# Patient Record
Sex: MALE | Race: BLACK OR AFRICAN AMERICAN | NOT HISPANIC OR LATINO | Employment: UNEMPLOYED | URBAN - METROPOLITAN AREA
[De-identification: names, ages, dates, MRNs, and addresses within clinical notes are randomized per-mention and may not be internally consistent; named-entity substitution may affect disease eponyms.]

---

## 2019-06-04 ENCOUNTER — HOSPITAL ENCOUNTER (EMERGENCY)
Facility: HOSPITAL | Age: 29
Discharge: HOME | End: 2019-06-04
Attending: EMERGENCY MEDICINE
Payer: COMMERCIAL

## 2019-06-04 VITALS
OXYGEN SATURATION: 97 % | TEMPERATURE: 97.4 F | HEART RATE: 73 BPM | SYSTOLIC BLOOD PRESSURE: 143 MMHG | WEIGHT: 152.56 LBS | DIASTOLIC BLOOD PRESSURE: 75 MMHG | RESPIRATION RATE: 18 BRPM

## 2019-06-04 DIAGNOSIS — H93.8X9 EAR FULLNESS: ICD-10-CM

## 2019-06-04 DIAGNOSIS — J30.2 SEASONAL ALLERGIES: Primary | ICD-10-CM

## 2019-06-04 PROCEDURE — 99282 EMERGENCY DEPT VISIT SF MDM: CPT | Performed by: PHYSICIAN ASSISTANT

## 2019-06-04 PROCEDURE — 99282 EMERGENCY DEPT VISIT SF MDM: CPT

## 2019-06-04 RX ORDER — FLUTICASONE PROPIONATE 50 MCG
1 SPRAY, SUSPENSION (ML) NASAL DAILY
Qty: 16 G | Refills: 0 | Status: SHIPPED | OUTPATIENT
Start: 2019-06-04

## 2019-06-04 RX ORDER — LORATADINE 10 MG/1
10 TABLET ORAL DAILY
Qty: 20 TABLET | Refills: 0 | Status: SHIPPED | OUTPATIENT
Start: 2019-06-04

## 2022-10-14 ENCOUNTER — HOSPITAL ENCOUNTER (EMERGENCY)
Facility: HOSPITAL | Age: 32
Discharge: HOME | End: 2022-10-14
Attending: EMERGENCY MEDICINE | Admitting: EMERGENCY MEDICINE
Payer: COMMERCIAL

## 2022-10-14 VITALS
DIASTOLIC BLOOD PRESSURE: 74 MMHG | HEART RATE: 65 BPM | SYSTOLIC BLOOD PRESSURE: 143 MMHG | TEMPERATURE: 98 F | OXYGEN SATURATION: 99 % | WEIGHT: 152.34 LBS | RESPIRATION RATE: 16 BRPM

## 2022-10-14 DIAGNOSIS — H92.02 EAR DISCOMFORT, LEFT: Primary | ICD-10-CM

## 2022-10-14 PROCEDURE — 99282 EMERGENCY DEPT VISIT SF MDM: CPT

## 2022-10-14 PROCEDURE — 99282 EMERGENCY DEPT VISIT SF MDM: CPT | Performed by: EMERGENCY MEDICINE

## 2022-10-14 NOTE — Clinical Note
Darwin Danielson was seen and treated in our emergency department on 10/14/2022  Diagnosis:     Ema Cortés  may return to work on return date  He may return on this date: 10/15/2022         If you have any questions or concerns, please don't hesitate to call        Tabitha Hernandez MD    ______________________________           _______________          _______________  [facility] [place] [facility]                              Date                                Time

## 2022-10-14 NOTE — ED PROVIDER NOTES
History  Chief Complaint   Patient presents with   • Earache     L ear pain "throbbing" x1 week; no meds taken for symptoms     This is a 28 y o with PMH of seasonal allergies who presents today for earache for the past month  He states that the pain is like a pressure and happens intermittently  Reports having it last night, but happens 2-3 times a week  Denies ear discharge, fever, hearing loss, loss of balance and URI symptoms  Patient has not tried any medication at home  Prior to Admission Medications   Prescriptions Last Dose Informant Patient Reported? Taking?   fluticasone (FLONASE) 50 mcg/act nasal spray   No No   Si spray into each nostril daily   loratadine (CLARITIN) 10 mg tablet   No No   Sig: Take 1 tablet (10 mg total) by mouth daily      Facility-Administered Medications: None       History reviewed  No pertinent past medical history  Past Surgical History:   Procedure Laterality Date   • EYE SURGERY         History reviewed  No pertinent family history  I have reviewed and agree with the history as documented  E-Cigarette/Vaping     E-Cigarette/Vaping Substances     Social History     Tobacco Use   • Smoking status: Never Smoker   • Smokeless tobacco: Never Used   Substance Use Topics   • Alcohol use: Never   • Drug use: Never        Review of Systems   Constitutional: Negative for chills and fever  HENT: Positive for ear pain  Negative for ear discharge, facial swelling, hearing loss, rhinorrhea, sore throat and tinnitus  Eyes: Negative for pain and visual disturbance  Respiratory: Negative for cough and shortness of breath  Cardiovascular: Negative for chest pain and palpitations  Gastrointestinal: Negative for abdominal pain, nausea and vomiting  Genitourinary: Negative for dysuria and hematuria  Musculoskeletal: Negative for arthralgias and back pain  Skin: Negative for color change and rash  Neurological: Negative for seizures and syncope     All other systems reviewed and are negative  Physical Exam  ED Triage Vitals [10/14/22 1120]   Temperature Pulse Respirations Blood Pressure SpO2   98 °F (36 7 °C) 65 16 143/74 99 %      Temp Source Heart Rate Source Patient Position - Orthostatic VS BP Location FiO2 (%)   Tympanic -- Sitting Left arm --      Pain Score       --             Orthostatic Vital Signs  Vitals:    10/14/22 1120   BP: 143/74   Pulse: 65   Patient Position - Orthostatic VS: Sitting       Physical Exam  Vitals and nursing note reviewed  Constitutional:       General: He is not in acute distress  Appearance: Normal appearance  He is well-developed  HENT:      Head: Normocephalic and atraumatic  Right Ear: Tympanic membrane, ear canal and external ear normal  There is no impacted cerumen  Left Ear: Tympanic membrane, ear canal and external ear normal  There is no impacted cerumen  Nose: Nose normal  No congestion or rhinorrhea  Mouth/Throat:      Mouth: Mucous membranes are moist       Pharynx: Oropharynx is clear  No oropharyngeal exudate or posterior oropharyngeal erythema  Eyes:      General: No scleral icterus  Conjunctiva/sclera: Conjunctivae normal    Cardiovascular:      Rate and Rhythm: Normal rate and regular rhythm  Heart sounds: No murmur heard  Pulmonary:      Effort: Pulmonary effort is normal  No respiratory distress  Breath sounds: Normal breath sounds  No wheezing or rales  Musculoskeletal:      Cervical back: Neck supple  Right lower leg: No edema  Left lower leg: No edema  Skin:     General: Skin is warm and dry  Capillary Refill: Capillary refill takes less than 2 seconds  Neurological:      General: No focal deficit present  Mental Status: He is alert and oriented to person, place, and time     Psychiatric:         Mood and Affect: Mood normal          Behavior: Behavior normal          ED Medications  Medications - No data to display    Diagnostic Studies  Results Reviewed     None                 No orders to display         Procedures  Procedures      ED Course     Patient remained hemodynamically stable while in the ED  He was discharged home in stable condition  MDM  Number of Diagnoses or Management Options  Ear discomfort, left  Diagnosis management comments: 28 y o presenting with intermittent left ear discomfort for the past month  Ear exam was normal  No signs of infection or neurological disorders were noted  Patient was reassured and instructed to follow up with PCP  Disposition  Final diagnoses:   Ear discomfort, left     Time reflects when diagnosis was documented in both MDM as applicable and the Disposition within this note     Time User Action Codes Description Comment    10/14/2022 11:41 AM Edgar Zapata Add [H92 02] Ear discomfort, left       ED Disposition     ED Disposition   Discharge    Condition   Stable    Date/Time   Fri Oct 14, 2022 11:41 AM    Comment   Tonny Winn discharge to home/self care  Follow-up Information     Follow up With Specialties Details [facility]   [address] [place] [id number] [phone]            Discharge Medication List as of 10/14/2022 11:42 AM      CONTINUE these medications which have NOT CHANGED    Details   fluticasone (FLONASE) 50 mcg/act nasal spray 1 spray into each nostril daily, Starting Tue 6/4/2019, Print      loratadine (CLARITIN) 10 mg tablet Take 1 tablet (10 mg total) by mouth daily, Starting Tue 6/4/2019, Print           No discharge procedures on file  PDMP Review     None           ED Provider  Attending physically available and evaluated Tonny Winn  RODRIGO managed the patient along with the ED Attending      Electronically Signed by         Arcelia Donohue MD  10/14/22 3162

## 2023-11-22 ENCOUNTER — APPOINTMENT (EMERGENCY)
Dept: CT IMAGING | Facility: HOSPITAL | Age: 33
End: 2023-11-22
Payer: COMMERCIAL

## 2023-11-22 ENCOUNTER — HOSPITAL ENCOUNTER (EMERGENCY)
Facility: HOSPITAL | Age: 33
Discharge: HOME | End: 2023-11-22
Attending: EMERGENCY MEDICINE
Payer: COMMERCIAL

## 2023-11-22 VITALS
HEIGHT: 70 IN | OXYGEN SATURATION: 100 % | DIASTOLIC BLOOD PRESSURE: 94 MMHG | RESPIRATION RATE: 18 BRPM | SYSTOLIC BLOOD PRESSURE: 130 MMHG | WEIGHT: 147.49 LBS | BODY MASS INDEX: 21.11 KG/M2 | TEMPERATURE: 98 F | HEART RATE: 55 BPM

## 2023-11-22 DIAGNOSIS — R10.9 ABDOMINAL PAIN: Primary | ICD-10-CM

## 2023-11-22 DIAGNOSIS — K52.9 ENTERITIS: ICD-10-CM

## 2023-11-22 DIAGNOSIS — Q63.1 HORSESHOE KIDNEY: ICD-10-CM

## 2023-11-22 LAB
ANION GAP SERPL CALCULATED.3IONS-SCNC: 5 MMOL/L
BASOPHILS # BLD AUTO: 0.07 THOUSANDS/ÂΜL (ref 0–0.1)
BASOPHILS NFR BLD AUTO: 1 % (ref 0–1)
BILIRUB UR QL STRIP: NEGATIVE
BUN SERPL-MCNC: 11 MG/DL (ref 5–25)
CALCIUM SERPL-MCNC: 9.5 MG/DL (ref 8.4–10.2)
CHLORIDE SERPL-SCNC: 102 MMOL/L (ref 96–108)
CLARITY UR: CLEAR
CO2 SERPL-SCNC: 28 MMOL/L (ref 21–32)
COLOR UR: YELLOW
CREAT SERPL-MCNC: 0.92 MG/DL (ref 0.6–1.3)
EOSINOPHIL # BLD AUTO: 0.06 THOUSAND/ÂΜL (ref 0–0.61)
EOSINOPHIL NFR BLD AUTO: 1 % (ref 0–6)
ERYTHROCYTE [DISTWIDTH] IN BLOOD BY AUTOMATED COUNT: 13.3 % (ref 11.6–15.1)
GFR SERPL CREATININE-BSD FRML MDRD: 108 ML/MIN/1.73SQ M
GLUCOSE SERPL-MCNC: 81 MG/DL (ref 65–140)
GLUCOSE UR STRIP-MCNC: NEGATIVE MG/DL
HCT VFR BLD AUTO: 39.7 % (ref 36.5–49.3)
HGB BLD-MCNC: 13.3 G/DL (ref 12–17)
HGB UR QL STRIP.AUTO: NEGATIVE
IMM GRANULOCYTES # BLD AUTO: 0.01 THOUSAND/UL (ref 0–0.2)
IMM GRANULOCYTES NFR BLD AUTO: 0 % (ref 0–2)
KETONES UR STRIP-MCNC: NEGATIVE MG/DL
LEUKOCYTE ESTERASE UR QL STRIP: NEGATIVE
LYMPHOCYTES # BLD AUTO: 3.08 THOUSANDS/ÂΜL (ref 0.6–4.47)
LYMPHOCYTES NFR BLD AUTO: 59 % (ref 14–44)
MCH RBC QN AUTO: 28.8 PG (ref 26.8–34.3)
MCHC RBC AUTO-ENTMCNC: 33.5 G/DL (ref 31.4–37.4)
MCV RBC AUTO: 86 FL (ref 82–98)
MONOCYTES # BLD AUTO: 0.55 THOUSAND/ÂΜL (ref 0.17–1.22)
MONOCYTES NFR BLD AUTO: 11 % (ref 4–12)
NEUTROPHILS # BLD AUTO: 1.45 THOUSANDS/ÂΜL (ref 1.85–7.62)
NEUTS SEG NFR BLD AUTO: 28 % (ref 43–75)
NITRITE UR QL STRIP: NEGATIVE
NRBC BLD AUTO-RTO: 0 /100 WBCS
PH UR STRIP.AUTO: 5.5 [PH] (ref 4.5–8)
PLATELET # BLD AUTO: 249 THOUSANDS/UL (ref 149–390)
PMV BLD AUTO: 10.2 FL (ref 8.9–12.7)
POTASSIUM SERPL-SCNC: 4 MMOL/L (ref 3.5–5.3)
PROT UR STRIP-MCNC: NEGATIVE MG/DL
RBC # BLD AUTO: 4.62 MILLION/UL (ref 3.88–5.62)
SODIUM SERPL-SCNC: 135 MMOL/L (ref 135–147)
SP GR UR STRIP.AUTO: >=1.03 (ref 1–1.03)
UROBILINOGEN UR QL STRIP.AUTO: 0.2 E.U./DL
WBC # BLD AUTO: 5.22 THOUSAND/UL (ref 4.31–10.16)

## 2023-11-22 PROCEDURE — 36415 COLL VENOUS BLD VENIPUNCTURE: CPT | Performed by: EMERGENCY MEDICINE

## 2023-11-22 PROCEDURE — 74177 CT ABD & PELVIS W/CONTRAST: CPT

## 2023-11-22 PROCEDURE — 81003 URINALYSIS AUTO W/O SCOPE: CPT

## 2023-11-22 PROCEDURE — 80048 BASIC METABOLIC PNL TOTAL CA: CPT | Performed by: EMERGENCY MEDICINE

## 2023-11-22 PROCEDURE — 99284 EMERGENCY DEPT VISIT MOD MDM: CPT

## 2023-11-22 PROCEDURE — G1004 CDSM NDSC: HCPCS

## 2023-11-22 PROCEDURE — 99285 EMERGENCY DEPT VISIT HI MDM: CPT | Performed by: EMERGENCY MEDICINE

## 2023-11-22 PROCEDURE — 85025 COMPLETE CBC W/AUTO DIFF WBC: CPT | Performed by: EMERGENCY MEDICINE

## 2023-11-22 RX ADMIN — IOHEXOL 100 ML: 350 INJECTION, SOLUTION INTRAVENOUS at 19:04

## 2023-11-22 NOTE — Clinical Note
Alisha Mahoney was seen and treated in our emergency department on 11/22/2023. No restrictions            Diagnosis:     Sara Winn  may return to work on return date. He may return on this date: 11/29/2023         If you have any questions or concerns, please don't hesitate to call.       Tonya Keller RN    ______________________________           _______________          _______________  Hospital Representative                              Date                                Time

## 2023-11-22 NOTE — ED PROVIDER NOTES
History  Chief Complaint   Patient presents with    Abdominal Pain     Pt reports lower abd pain/groin area for months, denies N/V. Reports cloudy urine, denies pain, burning. A 60-year-old male with no significant past medical history; presents with lower abdominal pain that has been present for the past several months, although colicky in nature. Patient reports increased pain more recently. He has also noticed cloudy urine over the past month, however denies dysuria, urinary frequency, urinary urgency and discharge. He has been tolerating a regular diet, and reports regular bowel nonbloody movements. Patient otherwise denies fever, chills, chest pain, shortness of breath, nausea, vomiting, diarrhea, peripheral edema and rashes. He has not been evaluated for this pain. He denies known family history of Crohn's, ulcerative colitis and cancer. History provided by:  Patient and medical records  Abdominal Pain      Prior to Admission Medications   Prescriptions Last Dose Informant Patient Reported? Taking?   fluticasone (FLONASE) 50 mcg/act nasal spray   No No   Si spray into each nostril daily   loratadine (CLARITIN) 10 mg tablet   No No   Sig: Take 1 tablet (10 mg total) by mouth daily      Facility-Administered Medications: None       History reviewed. No pertinent past medical history. Past Surgical History:   Procedure Laterality Date    EYE SURGERY         History reviewed. No pertinent family history. I have reviewed and agree with the history as documented. E-Cigarette/Vaping     E-Cigarette/Vaping Substances     Social History     Tobacco Use    Smoking status: Never    Smokeless tobacco: Never   Substance Use Topics    Alcohol use: Never    Drug use: Never       Review of Systems   Gastrointestinal:  Positive for abdominal pain. All other systems reviewed and are negative.       Physical Exam  Physical Exam  General Appearance: alert and oriented, nad, non toxic appearing  Skin: Warm, dry, intact. No cyanosis  HEENT: Atraumatic, normocephalic. No eye drainage. Normal hearing. Moist mucous membranes. Neck: Supple, trachea midline  Cardiac: RRR; no murmurs, rub, gallops. No pedal edema, 2+ pulses  Pulmonary: lungs CTAB; no wheezes, rales, rhonchi  Gastrointestinal: abdomen soft, mild left lower quadrant tenderness, nondistended; no guarding or rebound tenderness; good bowel sounds, no mass or bruits  Extremities:  No deformities.   No calf tenderness, no clubbing  Neuro:  no focal motor or sensory deficits, CN 2-12 grossly intact  Psych:  Normal mood and affect, normal judgement and insight      Vital Signs  ED Triage Vitals [11/22/23 1625]   Temperature Pulse Respirations Blood Pressure SpO2   98 °F (36.7 °C) 59 18 140/78 100 %      Temp Source Heart Rate Source Patient Position - Orthostatic VS BP Location FiO2 (%)   Oral Monitor Sitting Right arm --      Pain Score       No Pain           Vitals:    11/22/23 1625 11/22/23 1935   BP: 140/78 130/94   Pulse: 59 55   Patient Position - Orthostatic VS: Sitting Sitting         Visual Acuity      ED Medications  Medications   iohexol (OMNIPAQUE) 350 MG/ML injection (MULTI-DOSE) 100 mL (100 mL Intravenous Given 11/22/23 1904)       Diagnostic Studies  Results Reviewed       Procedure Component Value Units Date/Time    Basic metabolic panel [[id number]] Collected: 11/22/23 1804    Lab Status: Final result Specimen: Blood from Arm, Right Updated: 11/22/23 1825     Sodium 135 mmol/L      Potassium 4.0 mmol/L      Chloride 102 mmol/L      CO2 28 mmol/L      ANION GAP 5 mmol/L      BUN 11 mg/dL      Creatinine 0.92 mg/dL      Glucose 81 mg/dL      Calcium 9.5 mg/dL      eGFR 108 ml/min/1.73sq m     Narrative:      [place] guidelines for Chronic Kidney Disease (CKD):     Stage 1 with normal or high GFR (GFR > 90 mL/min/1.73 square meters)    Stage 2 Mild CKD (GFR = 60-89 mL/min/1.73 square meters)    Stage 3A Moderate CKD (GFR = 45-59 mL/min/1.73 square meters)    Stage 3B Moderate CKD (GFR = 30-44 mL/min/1.73 square meters)    Stage 4 Severe CKD (GFR = 15-29 mL/min/1.73 square meters)    Stage 5 End Stage CKD (GFR <15 mL/min/1.73 square meters)  Note: GFR calculation is accurate only with a steady state creatinine    Urine Macroscopic, POC [[id number]] Collected: 11/22/23 1810    Lab Status: Final result Specimen: Urine Updated: 11/22/23 1811     Color, UA Yellow     Clarity, UA Clear     pH, UA 5.5     Leukocytes, UA Negative     Nitrite, UA Negative     Protein, UA Negative mg/dl      Glucose, UA Negative mg/dl      Ketones, UA Negative mg/dl      Urobilinogen, UA 0.2 E.U./dl      Bilirubin, UA Negative     Occult Blood, UA Negative     Specific Gravity, UA >=1.030    Narrative:      CLINITEK RESULT    CBC and differential [[id number]]  (Abnormal) Collected: 11/22/23 1804    Lab Status: Final result Specimen: Blood from Arm, Right Updated: 11/22/23 1809     WBC 5.22 Thousand/uL      RBC 4.62 Million/uL      Hemoglobin 13.3 g/dL      Hematocrit 39.7 %      MCV 86 fL      MCH 28.8 pg      MCHC 33.5 g/dL      RDW 13.3 %      MPV 10.2 fL      Platelets 603 Thousands/uL      nRBC 0 /100 WBCs      Neutrophils Relative 28 %      Immat GRANS % 0 %      Lymphocytes Relative 59 %      Monocytes Relative 11 %      Eosinophils Relative 1 %      Basophils Relative 1 %      Neutrophils Absolute 1.45 Thousands/µL      Immature Grans Absolute 0.01 Thousand/uL      Lymphocytes Absolute 3.08 Thousands/µL      Monocytes Absolute 0.55 Thousand/µL      Eosinophils Absolute 0.06 Thousand/µL      Basophils Absolute 0.07 Thousands/µL                    CT abdomen pelvis with contrast   Final Result by Rodolfo Rodas MD (11/22 1943)   1. Mild free fluid within the lower pelvis, abnormal in a young male patient. Consider occult enteritis. Normal appendix identified. 2. Horseshoe kidney.                   Workstation performed: [id number] Procedures  Procedures         ED Course  ED Course as of 11/22/23 2258   Wed Nov 22, 2023   1820 Urine Macroscopic, POC  Negative for infection   1826 Labs WNL   1947 CT abdomen pelvis with contrast  1.) Mild free fluid within the lower pelvis, abnormal in a young male patient. Consider occult enteritis. Normal appendix identified. 2.) Horseshoe kidney. SBIRT 20yo+      Flowsheet Row Most Recent Value   Initial Alcohol Screen: US AUDIT-C     1. How often do you have a drink containing alcohol? 0 Filed at: 11/22/2023 1818   2. How many drinks containing alcohol do you have on a typical day you are drinking? 0 Filed at: 11/22/2023 1818   3a. Male UNDER 65: How often do you have five or more drinks on one occasion? 0 Filed at: 11/22/2023 1818   Audit-C Score 0 Filed at: 11/22/2023 1818   KENNEDI: How many times in the past year have you. .. Used an illegal drug or used a prescription medication for non-medical reasons? Never Filed at: 11/22/2023 1818                      Medical Decision Making  A 77-year-old male presents with acute on chronic lower abdominal pain, associated with cloudy urine. Patient with mild left lower quadrant tenderness on exam.  Will check labs for electrolyte abnormality, anemia & KIRBY. Will check urine for infection. Offered gonorrhea and chlamydia testing, however patient declines. Will obtain CTAP to evaluate for intra-abdominal pathology, including inflammatory bowel disease. Labs within normal limits, UA negative. CT with mild free fluid, suggesting enteritis. Given ongoing symptoms over the past few months, recommend GI follow up for further evaluation. Pt in agreement. Amount and/or Complexity of Data Reviewed  Labs: ordered. Decision-making details documented in ED Course. Radiology: ordered. Decision-making details documented in ED Course. Risk  Prescription drug management.              Disposition  Final diagnoses:   Abdominal pain   Enteritis   Horseshoe kidney     Time reflects when diagnosis was documented in both MDM as applicable and the Disposition within this note       Time User Action Codes Description Comment    11/22/2023  8:05 PM Alaina, [address] [place] [R10.9] Abdominal pain     11/22/2023  8:05 PM Tara Fell Add [K52.9] Enteritis     11/22/2023  8:05 PM Tara Fell Add [Q63.1] Horseshoe kidney           ED Disposition       ED Disposition   Discharge    Condition   Stable    Date/Time   Wed Nov 22, 2023 2005    Comment   Lana Reinoso discharge to home/self care. Follow-up Information       Follow up With Specialties Details Why Contact Info Additional [address] [facility] Schedule an appointment as soon as possible for a visit in 3 days For re-evaluation [address] [facility] [phone] [address] [facility] Schedule an appointment as soon as possible for a visit  For re-evaluation [address]  [place] [address] [facility],[address] [id number]  [address] [facility], [address], [place], [place], [id number] [phone]            Discharge Medication List as of 11/22/2023  8:06 PM        CONTINUE these medications which have NOT CHANGED    Details   fluticasone (FLONASE) 50 mcg/act nasal spray 1 spray into each nostril daily, Starting Tue 6/4/2019, Print      loratadine (CLARITIN) 10 mg tablet Take 1 tablet (10 mg total) by mouth daily, Starting Tue 6/4/2019, Print             No discharge procedures on file.     PDMP Review       None            ED Provider  Electronically Signed by             Forest Schuler DO  11/22/23 0109

## 2024-01-03 ENCOUNTER — TELEPHONE (OUTPATIENT)
Age: 34
End: 2024-01-03

## 2024-01-03 NOTE — TELEPHONE ENCOUNTER
PT missed appt - works nights sleeps days and lost track of time     Patients GI provider:  Dr. Corona     Number to return call: [phone]    Reason for call: Pt GI complaint -- LLQ abdominal pain    Scheduled procedure/appointment date if applicable: Appt 2/7/24

## 2024-02-07 ENCOUNTER — OFFICE VISIT (OUTPATIENT)
Dept: GASTROENTEROLOGY | Facility: MEDICAL CENTER | Age: 34
End: 2024-02-07
Payer: COMMERCIAL

## 2024-02-07 VITALS
BODY MASS INDEX: 21.64 KG/M2 | HEART RATE: 57 BPM | TEMPERATURE: 97.8 F | SYSTOLIC BLOOD PRESSURE: 112 MMHG | DIASTOLIC BLOOD PRESSURE: 71 MMHG | WEIGHT: 151.2 LBS | HEIGHT: 70 IN

## 2024-02-07 DIAGNOSIS — N50.89 SCROTAL SWELLING: ICD-10-CM

## 2024-02-07 DIAGNOSIS — R10.32 LLQ PAIN: Primary | ICD-10-CM

## 2024-02-07 PROCEDURE — 99244 OFF/OP CNSLTJ NEW/EST MOD 40: CPT | Performed by: STUDENT IN AN ORGANIZED HEALTH CARE EDUCATION/TRAINING PROGRAM

## 2024-02-07 NOTE — PROGRESS NOTES
[facility] - Outpatient Consultation  Jeff Schultz 33 y.o. male MRN: [id number]  Encounter: [id number]      Assessment and Plan:    1. LLQ pain    2. Scrotal swelling        33 y.o. male without significant medical history who is referred to GI for LLQ pain.    I do not think patient's transient LLQ pain lasting seconds at a time is related to the GI tract at all, especially given no relationship with bowel movements or food intake. I do not think his symptoms are consistent with food poisoning or gastroenteritis as he was told in the ER.    Rather, it sounds like he had prominent scrotal swelling and left groin pain during the peak of his LLQ pain, and both the scrotal swelling and pain appear to be improving together. He also has increased urination. I recommended urology evaluation, but patient declined since he is wary of having anyone performing a genitourinary exam. He also declined a scrotal exam from me today.    For the sake of thoroughness, I will check celiac serologies and inflammatory markers. Fecal calprotectin was recommended as well, but patient is not interested in a stool test at this time. No indication for colonoscopy.    - CRP, celiac serologies  - Fiber supplementation or increase fiber in diet  - Consider urology referral in the future if symptoms flare up again. Patient declines referral at this time. Defer further discussions to PCP  - No plan for colonoscopy    PRN follow up    Orders Placed This Encounter   Procedures    C-reactive protein    Tissue transglutaminase, IgA    IgA     ______________________________________________________________________    History of Present Illness:    Jeff Schultz is a 33 y.o. male without significant medical history who is referred to GI for LLQ pain.    Patient reports to several months of intermittent LLQ pain. Initially, the pain was sharp and severe, causing him to have to change positions. At the time, he  "also noticed left scrotal swelling which seemed unusual, and the pain seemed to be radiating within the left groin as well. Over time, he left scrotal swelling has decreased, and his LLQ pain has improved significantly as well. Currently, he occasionally notices a mild LLQ discomfort that lasts seconds at a time. No clear trigger for pain, and it is not associated with food intake or bowel movements. He does admit to more frequent urination but no dysuria. He has not been sexually active for years.    He is mildly constipated at times.  No diarrhea, hematochezia, melena, unintentional weight loss, or family history of IBD.    He went to  ER 11/2023 where labs including CBC, BMP, and UA were unremarkable. CT A/P showed a horseshoe kidney and mild free fluid int he lower pelvis. He was told he could have a viral gastroenteritis or food poisoning. He went to [facility] ER 12/2023 where he was discharged without further work-up.      Review of Systems:  As per HPI. Otherwise negative.      Historical Information   History reviewed. No pertinent past medical history.  Past Surgical History:   Procedure Laterality Date    EYE SURGERY       Social History   Social History     Substance and Sexual Activity   Alcohol Use Never     Social History     Substance and Sexual Activity   Drug Use Never     Social History     Tobacco Use   Smoking Status Never   Smokeless Tobacco Never     History reviewed. No pertinent family history.    Meds/Allergies       Current Outpatient Medications:     fluticasone (FLONASE) 50 mcg/act nasal spray    loratadine (CLARITIN) 10 mg tablet    No Known Allergies        Objective     Blood pressure 112/71, pulse 57, temperature 97.8 °F (36.6 °C), temperature source Tympanic Core, height 5' 10\" (1.778 m), weight 68.6 kg (151 lb 3.2 oz). Body mass index is 21.69 kg/m².        Physical Exam:      General: No acute distress  Abdomen: Soft, non-tender, non-distended, normoactive bowel sounds, neg Carnett's " sign  : patient declined  Neuro: Awake, alert, oriented x 3    Lab Results:   Lab Results   Component Value Date/Time    WBC 5.22 11/22/2023 06:04 PM    HGB 13.3 11/22/2023 06:04 PM     11/22/2023 06:04 PM    SODIUM 135 11/22/2023 06:04 PM    SODIUM 137 02/27/2019 03:02 PM    K 4.0 11/22/2023 06:04 PM    K 4.7 02/27/2019 03:02 PM     11/22/2023 06:04 PM     02/27/2019 03:02 PM    CO2 28 11/22/2023 06:04 PM    CO2 26 02/27/2019 03:02 PM    BUN 11 11/22/2023 06:04 PM    BUN 9 02/27/2019 03:02 PM    CREATININE 0.92 11/22/2023 06:04 PM    CREATININE 1.06 02/27/2019 03:02 PM    AST 28 02/27/2019 03:02 PM    ALT 31 02/27/2019 03:02 PM    ALKPHOS 66 02/27/2019 03:02 PM    TBILI 0.7 02/27/2019 03:02 PM    ALB 4.4 02/27/2019 03:02 PM

## 2024-02-07 NOTE — PATIENT INSTRUCTIONS
- Start taking fiber supplementation (Metamucil, Citrucel, Benefiber, etc). Please mix one heaping tablespoon with an 8 ounce glass of water and drink every morning. This can be increased to twice per day if needed. Fiber tablets and gummies will also work. The goal is to have regular, formed, and easy-to-pass bowel movements. The effect is gradual, so please use it consistently for at least 2 weeks. Stay well-hydrated to avoid constipation.

## 2024-03-12 ENCOUNTER — TELEPHONE (OUTPATIENT)
Age: 34
End: 2024-03-12

## 2024-03-12 NOTE — TELEPHONE ENCOUNTER
Called and LMOM for patient to return call to the office. If he calls back, wish to have him have PCP order US scrotum/testicles prior to scheduled appointment for further evaluation of scrotal swelling.

## 2024-03-12 NOTE — TELEPHONE ENCOUNTER
New Patient    What is the reason for the patient’s appointment?:Patient called stating he is having swelling on the left side testicle.  He stated about 6 months.  Patient is scheduled to see Dr. Randolph 04/25/24 at 1030 am. At [facility]. Please review if time frame is appropriate.     Patient can be reached [phone]    What office location does the patient prefer?:[facility]     Does patient have Imaging/Lab Results:    Have patient records been requested?:  If No, are the records showing in Epic:       INSURANCE:   Do we accept the patient's insurance or is the patient Self-Pay?:    Insurance Provider:[employer]   Plan Type/Number:   Member ID#:       HISTORY:   Has the patient had any previous Urologist(s)?:no     Was the patient seen in the ED?:    Has the patient had any outside testing done?:    Does the patient have a personal history of cancer?:no

## 2024-04-25 ENCOUNTER — TELEPHONE (OUTPATIENT)
Dept: UROLOGY | Facility: CLINIC | Age: 34
End: 2024-04-25

## 2024-04-25 NOTE — TELEPHONE ENCOUNTER
Patient did not present for their scheduled appointment despite reminder phone calls. We would be happy to reschedule patient if they or their primary team would like.

## 2024-07-09 ENCOUNTER — TELEPHONE (OUTPATIENT)
Dept: UROLOGY | Facility: CLINIC | Age: 34
End: 2024-07-09

## 2024-07-09 NOTE — TELEPHONE ENCOUNTER
Voicemail left for the patient stating his appointment on 7/19/2024 with Dr Roldan needs to be rescheduled .    Please schedule non urgent appointment with [facility] for testicular swelling.  Patient no show for his first appointment.  Thanks

## 2024-09-09 NOTE — PROGRESS NOTES
9/10/2024    Assessment and Plan    34 y.o. male managed by [facility]     1. Testicular swelling  Assessment & Plan:  Testicular swelling 1 whole year now   Swelling more so on the left   No pain with the swelling   Denies issues with urination   US testicles 4/2024  Impression- 1.)No testicular mass or torsion.   2. Bilateral small varicoceles.   3. Small right-sided hydrocele.   Deferred exam   Discussed US findings   Patient made aware that varicoceles can cause infertility   Discussed referral to MD for treatment- patient deferred   Plan to follow up PRN       History of Present Illness  Jeff Schultz is a 34 y.o. male here for evaluation of testicular swelling.  Patient reports that testicular swelling has been ongoing for 1 year now.  Patient reports that the left side is more swollen than the right side.  Patient reports no pain associated with swelling.  Patient also denies issues with urination at this time. No fever or chills.  Denies history of urinary tract infections, kidney stones.  Ultrasound was completed 4/2024 which showed no testicular mass or torsion with equal Doppler flow to both testicles, bilateral small varicocele and a small right-sided hydrocele.  Patient reports that he has children at this time and is not sure if he wants future children.  Patient additionally reports that he has been having left lower quadrant pain that comes and goes.  He feels that it is more so associated after working out.  Patient denies issues with constipation or diarrhea.  Patient recently saw gastroenterology also for left lower quadrant pain.  He deferred examination at that time also.  Discussed following up with PCP if comfort measures such and NSAIDS, heart and rest, do not improve left lower quadrant pain.    Review of Systems   Constitutional:  Negative for chills and fever.   HENT:  Negative for ear pain and sore throat.    Eyes:  Negative for pain and visual disturbance.   Respiratory:   "Negative for cough and shortness of breath.    Cardiovascular:  Negative for chest pain and palpitations.   Gastrointestinal:  Positive for abdominal pain (LLQ- intermittent). Negative for vomiting.   Genitourinary:  Positive for scrotal swelling. Negative for decreased urine volume, difficulty urinating, dysuria, flank pain, frequency, hematuria, testicular pain and urgency.   Musculoskeletal:  Negative for arthralgias and back pain.   Skin:  Negative for color change and rash.   Neurological:  Negative for seizures and syncope.   All other systems reviewed and are negative.    Vitals  Vitals:    09/10/24 1352   BP: 100/70   BP Location: Left arm   Patient Position: Sitting   Cuff Size: Adult   Pulse: 73   SpO2: 98%   Weight: 68 kg (150 lb)   Height: 5' 10\" (1.778 m)       Physical Exam  Vitals reviewed.   Constitutional:       Appearance: Normal appearance.   HENT:      Head: Normocephalic and atraumatic.   Eyes:      Conjunctiva/sclera: Conjunctivae normal.   Pulmonary:      Effort: Pulmonary effort is normal.   Abdominal:      General: Abdomen is flat. There is no distension.      Palpations: Abdomen is soft. There is no mass.      Tenderness: There is no abdominal tenderness. There is no guarding.      Hernia: No hernia is present.   Genitourinary:     Comments: Exam deferred     Musculoskeletal:         General: Normal range of motion.      Cervical back: Normal range of motion.   Skin:     General: Skin is warm and dry.   Neurological:      General: No focal deficit present.      Mental Status: He is alert and oriented to person, place, and time.   Psychiatric:         Mood and Affect: Mood normal.         Behavior: Behavior normal.         Thought Content: Thought content normal.         Judgment: Judgment normal.       Past History  History reviewed. No pertinent past medical history.  Social History     Socioeconomic History    Marital status: Single     Spouse name: None    Number of children: None    " Years of education: None    Highest education level: None   Occupational History    None   Tobacco Use    Smoking status: Never    Smokeless tobacco: Never   Substance and Sexual Activity    Alcohol use: Never    Drug use: Never    Sexual activity: None   Other Topics Concern    None   Social History Narrative    None     Social Determinants of Health     Financial Resource Strain: Medium Risk (4/3/2024)    [facility]    Overall Financial Resource Strain (CARDIA)     Difficulty of Paying Living Expenses: Somewhat hard   Food Insecurity: Food Insecurity Present (4/3/2024)    [facility]    Hunger Vital Sign     Worried About Running Out of Food in the Last Year: Sometimes true     Ran Out of Food in the Last Year: Never true   Transportation Needs: Unmet Transportation Needs (4/3/2024)    [facility]    PRAPARE - Transportation     Lack of Transportation (Medical): Yes     Lack of Transportation (Non-Medical): Yes   Physical Activity: Not on file   Stress: No Stress Concern Present (4/3/2024)    [facility]    [place] [employer] - Occupational Stress Questionnaire     Feeling of Stress : Only a little   Social Connections: Feeling Socially Integrated (4/3/2024)    [facility]    OASIS : Social Isolation     How often do you feel lonely or isolated from those around you?: Rarely   Intimate Partner Violence: Not At Risk (4/3/2024)    [facility]    Humiliation, Afraid, Rape, and Kick questionnaire     Fear of Current or Ex-Partner: No     Emotionally Abused: No     Physically Abused: No     Sexually Abused: No   Housing Stability:  "High Risk (4/3/2024)    [facility]    Housing Stability Vital Sign     Unable to Pay for Housing in the Last Year: Yes     Number of Places Lived in the Last Year: 1     Unstable Housing in the Last Year: No     Social History     Tobacco Use   Smoking Status Never   Smokeless Tobacco Never     History reviewed. No pertinent family history.    The following portions of the patient's history were reviewed and updated as appropriate: allergies, current medications, past medical history, past social history, past surgical history and problem list.    Results  No results found for this or any previous visit (from the past 1 hour(s)).]  No results found for: \"PSA\"  Lab Results   Component Value Date    CALCIUM 9.5 11/22/2023    K 4.0 11/22/2023    CO2 28 11/22/2023     11/22/2023    BUN 11 11/22/2023    CREATININE 0.92 11/22/2023     Lab Results   Component Value Date    WBC 5.22 11/22/2023    HGB 13.3 11/22/2023    HCT 39.7 11/22/2023    MCV 86 11/22/2023     11/22/2023       "

## 2024-09-10 ENCOUNTER — OFFICE VISIT (OUTPATIENT)
Dept: UROLOGY | Facility: CLINIC | Age: 34
End: 2024-09-10

## 2024-09-10 VITALS
OXYGEN SATURATION: 98 % | HEIGHT: 70 IN | HEART RATE: 73 BPM | WEIGHT: 150 LBS | BODY MASS INDEX: 21.47 KG/M2 | SYSTOLIC BLOOD PRESSURE: 100 MMHG | DIASTOLIC BLOOD PRESSURE: 70 MMHG

## 2024-09-10 DIAGNOSIS — N50.89 TESTICULAR SWELLING: Primary | ICD-10-CM

## 2024-09-10 NOTE — ASSESSMENT & PLAN NOTE
Testicular swelling 1 whole year now   Swelling more so on the left   No pain with the swelling   Denies issues with urination   US testicles 4/2024  Impression- 1.)No testicular mass or torsion.   2. Bilateral small varicoceles.   3. Small right-sided hydrocele.   Most recent UA- negative for infection   Deferred exam   Discussed US findings   Patient made aware that varicoceles can cause infertility   Discussed referral to MD for treatment- patient deferred   Plan to follow up PRN

## 2025-01-12 ENCOUNTER — HOSPITAL ENCOUNTER (EMERGENCY)
Facility: HOSPITAL | Age: 35
Discharge: HOME | End: 2025-01-12
Attending: EMERGENCY MEDICINE

## 2025-01-12 VITALS
HEART RATE: 75 BPM | SYSTOLIC BLOOD PRESSURE: 124 MMHG | DIASTOLIC BLOOD PRESSURE: 70 MMHG | WEIGHT: 149.47 LBS | TEMPERATURE: 97.7 F | OXYGEN SATURATION: 100 % | BODY MASS INDEX: 21.45 KG/M2 | RESPIRATION RATE: 16 BRPM

## 2025-01-12 DIAGNOSIS — H66.90 OTITIS MEDIA: Primary | ICD-10-CM

## 2025-01-12 DIAGNOSIS — J11.1 INFLUENZA: ICD-10-CM

## 2025-01-12 LAB
FLUAV AG UPPER RESP QL IA.RAPID: POSITIVE
FLUBV AG UPPER RESP QL IA.RAPID: NEGATIVE
SARS-COV+SARS-COV-2 AG RESP QL IA.RAPID: NEGATIVE

## 2025-01-12 PROCEDURE — 87804 INFLUENZA ASSAY W/OPTIC: CPT | Performed by: EMERGENCY MEDICINE

## 2025-01-12 PROCEDURE — 87811 SARS-COV-2 COVID19 W/OPTIC: CPT | Performed by: EMERGENCY MEDICINE

## 2025-01-12 PROCEDURE — 99284 EMERGENCY DEPT VISIT MOD MDM: CPT | Performed by: EMERGENCY MEDICINE

## 2025-01-12 PROCEDURE — 99283 EMERGENCY DEPT VISIT LOW MDM: CPT

## 2025-01-12 RX ORDER — AMOXICILLIN 500 MG/1
500 CAPSULE ORAL 3 TIMES DAILY
Qty: 21 CAPSULE | Refills: 0 | Status: SHIPPED | OUTPATIENT
Start: 2025-01-12 | End: 2025-01-19

## 2025-01-12 NOTE — ED PROVIDER NOTES
Time reflects when diagnosis was documented in both MDM as applicable and the Disposition within this note       Time User Action Codes Description Comment    1/12/2025  5:38 PM Farrukh Leigh Add [H66.90] Otitis media     1/12/2025  5:38 PM Farrukh Leigh Add [J11.1] Influenza           ED Disposition       ED Disposition   Discharge    Condition   Stable    Date/Time   Sun Jan 12, 2025  5:39 PM    Comment   Jeff Schultz discharge to home/self care.                   Assessment & Plan       Medical Decision Making  Patient positive for influenza also has a left otitis media with effusion secondary above placed on antibiotics for that she has no airway compromise no respiratory distress    Amount and/or Complexity of Data Reviewed  Labs: ordered.    Risk  Prescription drug management.             Medications - No data to display    ED Risk Strat Scores                          SBIRT 20yo+      Flowsheet Row Most Recent Value   Initial Alcohol Screen: US AUDIT-C     1. How often do you have a drink containing alcohol? 0 Filed at: 01/12/2025 1648   2. How many drinks containing alcohol do you have on a typical day you are drinking?  0 Filed at: 01/12/2025 1648   3a. Male UNDER 65: How often do you have five or more drinks on one occasion? 0 Filed at: 01/12/2025 1648   3b. FEMALE Any Age, or MALE 65+: How often do you have 4 or more drinks on one occassion? 0 Filed at: 01/12/2025 1648   Audit-C Score 0 Filed at: 01/12/2025 1648   KENNEDI: How many times in the past year have you...    Used an illegal drug or used a prescription medication for non-medical reasons? Never Filed at: 01/12/2025 1648                            History of Present Illness       Chief Complaint   Patient presents with    Earache     Left - he had flu symptoms last . Took ibuprofen for the pain       History reviewed. No pertinent past medical history.   Past Surgical History:   Procedure Laterality Date    EYE SURGERY        History reviewed.  No pertinent family history.   Social History     Tobacco Use    Smoking status: Never    Smokeless tobacco: Never   Substance Use Topics    Alcohol use: Never    Drug use: Never      E-Cigarette/Vaping      E-Cigarette/Vaping Substances      I have reviewed and agree with the history as documented.     With a viral-like illness for the last week some nasal congestion very mild cough scratchy throat he is has off-and-on problems he said his left ear but now over the last couple days he has been having increased pain in his ear denies any fever chest pain shortness of breath vomiting or diarrhea or rash no vertigo  nodischarge from the area      Earache  Associated symptoms: no abdominal pain, no fever and no rash        Review of Systems   Constitutional:  Negative for chills and fever.   HENT:  Positive for ear pain.    Eyes:  Negative for pain and visual disturbance.   Respiratory:  Negative for shortness of breath.    Cardiovascular:  Negative for chest pain.   Gastrointestinal:  Negative for abdominal pain.   Musculoskeletal:  Negative for arthralgias and back pain.   Skin:  Negative for color change and rash.   Neurological:  Negative for seizures and syncope.   All other systems reviewed and are negative.          Objective       ED Triage Vitals [01/12/25 1646]   Temperature Pulse Blood Pressure Respirations SpO2 Patient Position - Orthostatic VS   97.7 °F (36.5 °C) 75 124/70 16 100 % Sitting      Temp Source Heart Rate Source BP Location FiO2 (%) Pain Score    Oral Monitor Left arm -- --      Vitals      Date and Time Temp Pulse SpO2 Resp BP Pain Score FACES Pain Rating User   01/12/25 1646 97.7 °F (36.5 °C) 75 100 % 16 124/70 -- -- FK            Physical Exam  Vitals and nursing note reviewed.   Constitutional:       General: He is not in acute distress.     Appearance: He is well-developed. He is not ill-appearing, toxic-appearing or diaphoretic.   HENT:      Head: Normocephalic and atraumatic.      Right  Ear: Tympanic membrane and ear canal normal.      Left Ear: Ear canal normal.      Ears:      Comments: Left TM is erythematous with small effusion both mastoids are nontender     Mouth/Throat:      Mouth: Mucous membranes are moist.      Pharynx: No oropharyngeal exudate or posterior oropharyngeal erythema.      Comments: uvula midline no dysphonia or trismus  Eyes:      Conjunctiva/sclera: Conjunctivae normal.   Cardiovascular:      Rate and Rhythm: Normal rate and regular rhythm.      Heart sounds: No murmur heard.  Pulmonary:      Effort: Pulmonary effort is normal. No respiratory distress.      Breath sounds: Normal breath sounds.   Abdominal:      Palpations: Abdomen is soft.      Tenderness: There is no abdominal tenderness.   Musculoskeletal:         General: No swelling.      Cervical back: Normal range of motion and neck supple. No rigidity or tenderness.   Lymphadenopathy:      Cervical: No cervical adenopathy.   Skin:     General: Skin is warm and dry.      Capillary Refill: Capillary refill takes less than 2 seconds.      Coloration: Skin is not jaundiced.   Neurological:      General: No focal deficit present.      Mental Status: He is alert.      Cranial Nerves: No cranial nerve deficit.   Psychiatric:         Mood and Affect: Mood normal.         Results Reviewed       Procedure Component Value Units Date/Time    FLU/COVID Rapid Antigen (30 min. TAT) - Preferred screening test in ED [[id number]]  (Abnormal) Collected: 01/12/25 1654    Lab Status: Final result Specimen: Nares from Nose Updated: 01/12/25 1722     SARS COV Rapid Antigen Negative     Influenza A Rapid Antigen Positive     Influenza B Rapid Antigen Negative    Narrative:      This test has been performed using the Quidel Leigh 2 FLU+SARS Antigen test under the Emergency Use Authorization (EUA). This test has been validated by the  and verified by the performing laboratory. The Leigh uses lateral flow immunofluorescent sandwich  assay to detect SARS-COV, Influenza A and Influenza B Antigen.     The Quidel Leigh 2 SARS Antigen test does not differentiate between SARS-CoV and SARS-CoV-2.     Negative results are presumptive and may be confirmed with a molecular assay, if necessary, for patient management. Negative results do not rule out SARS-CoV-2 or influenza infection and should not be used as the sole basis for treatment or patient management decisions. A negative test result may occur if the level of antigen in a sample is below the limit of detection of this test.     Positive results are indicative of the presence of viral antigens, but do not rule out bacterial infection or co-infection with other viruses.     All test results should be used as an adjunct to clinical observations and other information available to the provider.    FOR PEDIATRIC PATIENTS - copy/paste COVID Guidelines URL to browser: [email or web address]            No orders to display       Procedures    ED Medication and Procedure Management   None     Discharge Medication List as of 1/12/2025  5:39 PM        START taking these medications    Details   amoxicillin (AMOXIL) 500 mg capsule Take 1 capsule (500 mg total) by mouth 3 (three) times a day for 7 days, Starting Sun 1/12/2025, Until Sun 1/19/2025, Normal           No discharge procedures on file.  ED SEPSIS DOCUMENTATION   Time reflects when diagnosis was documented in both MDM as applicable and the Disposition within this note       Time User Action Codes Description Comment    1/12/2025  5:38 PM Farrukh Leigh Add [H66.90] Otitis media     1/12/2025  5:38 PM Farrukh Leigh Add [J11.1] Influenza                  Farrukh Leigh MD  01/12/25 1928

## 2025-01-12 NOTE — Clinical Note
Jeff Schultz was seen and treated in our emergency department on 1/12/2025.                Diagnosis:     Jeff  .    He may return on this date: 01/14/2025         If you have any questions or concerns, please don't hesitate to call.      Farrukh Leigh MD    ______________________________           _______________          _______________  Hospital Representative                              Date                                Time